# Patient Record
(demographics unavailable — no encounter records)

---

## 2025-04-18 NOTE — DISCUSSION/SUMMARY
[Medication Risks Reviewed] : Medication risks reviewed [de-identified] : reviewed the case and the imaging with the patient  lumbar radiculopathy  discussion of the condition and treatment options cautions discussed questions answered discussion of natural history of the condition and what the next step would be  mrI l SPINE  PT  REC HE TAKE THE mdP

## 2025-04-18 NOTE — HISTORY OF PRESENT ILLNESS
[de-identified] :  04/18/2025:  58-year-old male presents with low back pain and intermittent RLE lateral thigh pain that radiates to knee. Symptoms worsen with ambulation. Has been present x 3 month with no acute trauma or injury.   Went to  for evaluation, receive MDP but has not started treatment.  Has been evaluated in 2023 by pain management receiving TPI for back pain with relief.  No PT so far   PmHx: CAD NKDA  xrays today: L spine - mild spondylosis AP PELVIS - NEGATIVE   RETIRED

## 2025-05-14 NOTE — HISTORY OF PRESENT ILLNESS
[3] : 3 [de-identified] :  04/18/2025:  58-year-old male presents with low back pain and intermittent RLE lateral thigh pain that radiates to knee. Symptoms worsen with ambulation. Has been present x 3 month with no acute trauma or injury.   Went to  for evaluation, receive MDP but has not started treatment.  Has been evaluated in 2023 by pain management receiving TPI for back pain with relief.  No PT so far   PmHx: CAD NKDA  xrays today: L spine - mild spondylosis AP PELVIS - NEGATIVE   RETIRED   --------------------------------------------------------  5.14.25 Patient here for lower back pain. Here to review MRI  Plan at last was "mrI l SPINE  PT  REC HE TAKE THE mdP"  took the MDP  has done a bit of PT  Having lower back pain at night - legs not too bad   MRi L spine - see report - [facility] by my read - severe stenosis L4-5, moderate at L5-s1

## 2025-05-14 NOTE — DISCUSSION/SUMMARY
[Medication Risks Reviewed] : Medication risks reviewed [de-identified] : reviewed the case and the imaging with the patient  severe lumbar spinal stenosis with back pain and lumbar radiculopathy  Worse at l4-5, lesser at other levels  discussion of the condition and treatment options cautions discussed questions answered discussion of natural history of the condition and what the next step would be PT/consider LESI   discussed it is degenerative and degenerative processes likely to progress with age - though not necessarily quickly   Would rec trial of conservative treatment prior to surgical intervention in this case   his daughter serves as  in this case